# Patient Record
Sex: MALE | Race: WHITE | Employment: OTHER | URBAN - NONMETROPOLITAN AREA
[De-identification: names, ages, dates, MRNs, and addresses within clinical notes are randomized per-mention and may not be internally consistent; named-entity substitution may affect disease eponyms.]

---

## 2017-01-03 ENCOUNTER — TELEPHONE (OUTPATIENT)
Dept: CARDIOLOGY | Age: 67
End: 2017-01-03

## 2017-01-23 ENCOUNTER — OFFICE VISIT (OUTPATIENT)
Dept: CARDIOLOGY | Age: 67
End: 2017-01-23

## 2017-01-23 VITALS
SYSTOLIC BLOOD PRESSURE: 135 MMHG | BODY MASS INDEX: 46.6 KG/M2 | WEIGHT: 263 LBS | HEIGHT: 63 IN | HEART RATE: 84 BPM | DIASTOLIC BLOOD PRESSURE: 75 MMHG

## 2017-01-23 DIAGNOSIS — R60.0 EDEMA OF BOTH LEGS: ICD-10-CM

## 2017-01-23 PROCEDURE — 99213 OFFICE O/P EST LOW 20 MIN: CPT | Performed by: INTERNAL MEDICINE

## 2017-01-23 RX ORDER — FUROSEMIDE 40 MG/1
40 TABLET ORAL 2 TIMES DAILY
COMMUNITY

## 2017-01-24 DIAGNOSIS — R00.2 PALPITATION: ICD-10-CM

## 2017-01-24 DIAGNOSIS — R60.0 BILATERAL LEG EDEMA: ICD-10-CM

## 2017-01-24 DIAGNOSIS — I47.1 SVT (SUPRAVENTRICULAR TACHYCARDIA) (HCC): ICD-10-CM

## 2017-01-24 DIAGNOSIS — R00.0 TACHYCARDIA: ICD-10-CM

## 2017-07-24 ENCOUNTER — OFFICE VISIT (OUTPATIENT)
Dept: CARDIOLOGY CLINIC | Age: 67
End: 2017-07-24
Payer: MEDICARE

## 2017-07-24 VITALS
HEIGHT: 63 IN | WEIGHT: 268 LBS | DIASTOLIC BLOOD PRESSURE: 80 MMHG | BODY MASS INDEX: 47.48 KG/M2 | SYSTOLIC BLOOD PRESSURE: 160 MMHG | HEART RATE: 80 BPM

## 2017-07-24 DIAGNOSIS — R60.0 BILATERAL LEG EDEMA: Primary | ICD-10-CM

## 2017-07-24 PROCEDURE — 99213 OFFICE O/P EST LOW 20 MIN: CPT | Performed by: INTERNAL MEDICINE

## 2018-01-22 ENCOUNTER — OFFICE VISIT (OUTPATIENT)
Dept: CARDIOLOGY CLINIC | Age: 68
End: 2018-01-22
Payer: MEDICARE

## 2018-01-22 VITALS
WEIGHT: 260 LBS | HEIGHT: 62 IN | HEART RATE: 68 BPM | SYSTOLIC BLOOD PRESSURE: 136 MMHG | DIASTOLIC BLOOD PRESSURE: 74 MMHG | BODY MASS INDEX: 47.84 KG/M2

## 2018-01-22 DIAGNOSIS — R60.0 EDEMA OF BOTH LEGS: Primary | ICD-10-CM

## 2018-01-22 PROCEDURE — 99212 OFFICE O/P EST SF 10 MIN: CPT | Performed by: INTERNAL MEDICINE

## 2018-01-22 NOTE — PROGRESS NOTES
Today's Date: 1/22/2018  Patient Name: Rosario Reagan  Patient's age: 78 y.o., 1950          The patient is a 78 y.o.  male is in the office for f/u, doing well cardiac wise, no new cardiac complaints. Past Medical History:   has a past medical history of Edema of both legs. Past Surgical History:   has a past surgical history that includes Femur Surgery. Home Medications:    Prior to Admission medications    Medication Sig Start Date End Date Taking? Authorizing Provider   furosemide (LASIX) 40 MG tablet Take 40 mg by mouth 2 times daily    Historical Provider, MD   Multiple Vitamins-Minerals (MULTI COMPLETE PO) Take 1 tablet by mouth daily    Historical Provider, MD       Allergies:  Review of patient's allergies indicates no known allergies. Social History:   reports that he has never smoked. He has never used smokeless tobacco. He reports that he does not drink alcohol or use drugs. REVIEW OF SYSTEMS:  CONSTITUTIONAL:NEGATIVE  HEENT:NEG  Cardiovascular: No chest pain, No dyspnea on exertion, No palpitations. Lower extremity edema: Yes  RESPIRATORY: DU  GASTROINTESTINAL:  negative  GENITOURINARY:  negative  INTEGUMENT:  negative  MUSCULOSKELETAL:  positive for  pain  NEUROLOGICAL:  negative    PHYSICAL EXAM:      /74   Pulse 68   Ht 5' 2\" (1.575 m)   Wt 260 lb (117.9 kg)   BMI 47.55 kg/m²    HEENT: PERRL, no cervical lymphadenopathy. No masses palpable. Cardiovascular:  · The apical impulse is not displaced  · Heart  Sounds:RRR, S4  · Jugular venous pulsation Normal  · The carotid upstroke is NL  · Peripheral pulses are symmetrical and full  Respiratory: Good respiratory effort.  On auscultation: clear to auscultation bilaterally  Abdomen:  · No masses or tenderness  · Bowel sounds present  Extremities:  ·  No Cyanosis or Clubbing  ·  Lower extremity edema: Yes  Skin: Warm and dry    Cardiac data:    EKG: SR, RBBB    Labs:     CBC: No results for

## 2018-09-17 ENCOUNTER — OFFICE VISIT (OUTPATIENT)
Dept: CARDIOLOGY CLINIC | Age: 68
End: 2018-09-17
Payer: MEDICARE

## 2018-09-17 VITALS
BODY MASS INDEX: 46.38 KG/M2 | WEIGHT: 252 LBS | SYSTOLIC BLOOD PRESSURE: 116 MMHG | DIASTOLIC BLOOD PRESSURE: 70 MMHG | HEIGHT: 62 IN | HEART RATE: 88 BPM

## 2018-09-17 DIAGNOSIS — R60.0 EDEMA OF BOTH LEGS: Primary | ICD-10-CM

## 2018-09-17 PROCEDURE — 99213 OFFICE O/P EST LOW 20 MIN: CPT | Performed by: INTERNAL MEDICINE

## 2018-09-17 RX ORDER — LISINOPRIL 10 MG/1
10 TABLET ORAL DAILY
COMMUNITY

## 2018-09-17 RX ORDER — DOXYCYCLINE HYCLATE 50 MG/1
100 CAPSULE ORAL 2 TIMES DAILY
COMMUNITY
Start: 2018-07-12 | End: 2019-03-04 | Stop reason: ALTCHOICE

## 2018-09-17 RX ORDER — ATORVASTATIN CALCIUM 20 MG/1
20 TABLET, FILM COATED ORAL DAILY
COMMUNITY

## 2018-09-17 NOTE — PROGRESS NOTES
Today's Date: 9/17/2018  Patient Name: Dannielle Morrison  Patient's age: 80 y.o., 1950          The patient is a 80 y.o.  male is in the office for f/u, Patient has been doing well cardiac wise, no new cardiac complaints. He denies angina, PND/Orthopnea.'      Past Medical History:   has a past medical history of Edema of both legs. Past Surgical History:   has a past surgical history that includes Femur Surgery. Home Medications:    Prior to Admission medications    Medication Sig Start Date End Date Taking? Authorizing Provider   doxycycline (VIBRAMYCIN) 50 MG capsule Take 100 mg by mouth 2 times daily 7/12/18  Yes Historical Provider, MD   furosemide (LASIX) 40 MG tablet Take 40 mg by mouth 2 times daily   Yes Historical Provider, MD   Multiple Vitamins-Minerals (MULTI COMPLETE PO) Take 1 tablet by mouth daily   Yes Historical Provider, MD   atorvastatin (LIPITOR) 20 MG tablet Take 20 mg by mouth daily    Historical Provider, MD   lisinopril (PRINIVIL;ZESTRIL) 10 MG tablet Take 10 mg by mouth daily    Historical Provider, MD       Allergies:  Patient has no known allergies. Social History:   reports that he has never smoked. He has never used smokeless tobacco. He reports that he does not drink alcohol or use drugs. REVIEW OF SYSTEMS:  CONSTITUTIONAL:NEGATIVE  HEENT:NEG  Cardiovascular: No chest pain, Yes dyspnea on exertion, No palpitations. Lower extremity edema: Yes  RESPIRATORY: DU  GASTROINTESTINAL:  negative  GENITOURINARY:  negative  INTEGUMENT:  negative  MUSCULOSKELETAL:  positive for  pain  NEUROLOGICAL:  negative    PHYSICAL EXAM:      /70   Pulse 88   Ht 5' 2\" (1.575 m)   Wt 252 lb (114.3 kg)   BMI 46.09 kg/m²    HEENT: PERRL, no cervical lymphadenopathy. No masses palpable.   Cardiovascular:  · The apical impulse is not displaced  · Heart  Sounds:RRR, S4  · Jugular venous pulsation Normal  · The carotid upstroke is NL  · Peripheral pulses are symmetrical and full  Respiratory: Good respiratory effort. On auscultation: clear to auscultation bilaterally  Abdomen:  · No masses or tenderness  · Bowel sounds present  Extremities:  ·  No Cyanosis or Clubbing  ·  Lower extremity edema: Yes  Skin: Warm and dry    Cardiac data:    EKG: SR, RBBB    Labs:     CBC: No results for input(s): WBC, HGB, HCT, PLT in the last 72 hours. BMP: No results for input(s): NA, K, CO2, BUN, CREATININE, LABGLOM, GLUCOSE in the last 72 hours. PT/INR: No results for input(s): PROTIME, INR in the last 72 hours. FASTING LIPID PANEL:No results found for: HDL, LDLDIRECT, LDLCALC, TRIG  LIVER PROFILE:No results for input(s): AST, ALT, LABALBU in the last 72 hours.     IMPRESSION:    HFpEF  HTN  HLP  LYMPHEDEMA  MORBID OBESITY  PVCs and APCs, ASX  RBBB: ASX  Patient Active Problem List   Diagnosis    SVT (supraventricular tachycardia) (HCC)    Tachycardia    Palpitation    Bilateral leg edema    Edema of both legs       RECOMMENDATIONS:  REGULAR EXERCISE  CALORIE RESTRICTION  WEIGHT LOSS  CONTINUE CURRENT TREATMENT    RTC 6  MONTHS          Yamileth Oleary [id number] Cardiology Consult           [phone]

## 2019-03-04 ENCOUNTER — OFFICE VISIT (OUTPATIENT)
Dept: CARDIOLOGY CLINIC | Age: 69
End: 2019-03-04
Payer: MEDICARE

## 2019-03-04 VITALS
SYSTOLIC BLOOD PRESSURE: 128 MMHG | WEIGHT: 253 LBS | HEIGHT: 62 IN | HEART RATE: 82 BPM | BODY MASS INDEX: 46.56 KG/M2 | DIASTOLIC BLOOD PRESSURE: 76 MMHG

## 2019-03-04 DIAGNOSIS — I47.1 SVT (SUPRAVENTRICULAR TACHYCARDIA) (HCC): ICD-10-CM

## 2019-03-04 DIAGNOSIS — R60.0 BILATERAL LEG EDEMA: ICD-10-CM

## 2019-03-04 DIAGNOSIS — E78.5 DYSLIPIDEMIA: ICD-10-CM

## 2019-03-04 DIAGNOSIS — I10 ESSENTIAL HYPERTENSION: Primary | ICD-10-CM

## 2019-03-04 PROCEDURE — 4040F PNEUMOC VAC/ADMIN/RCVD: CPT | Performed by: INTERNAL MEDICINE

## 2019-03-04 PROCEDURE — 99213 OFFICE O/P EST LOW 20 MIN: CPT | Performed by: INTERNAL MEDICINE

## 2019-03-04 PROCEDURE — 3017F COLORECTAL CA SCREEN DOC REV: CPT | Performed by: INTERNAL MEDICINE

## 2019-03-04 PROCEDURE — G8417 CALC BMI ABV UP PARAM F/U: HCPCS | Performed by: INTERNAL MEDICINE

## 2019-03-04 PROCEDURE — 1101F PT FALLS ASSESS-DOCD LE1/YR: CPT | Performed by: INTERNAL MEDICINE

## 2019-03-04 PROCEDURE — 1036F TOBACCO NON-USER: CPT | Performed by: INTERNAL MEDICINE

## 2019-03-04 PROCEDURE — 1123F ACP DISCUSS/DSCN MKR DOCD: CPT | Performed by: INTERNAL MEDICINE

## 2019-03-04 PROCEDURE — G8427 DOCREV CUR MEDS BY ELIG CLIN: HCPCS | Performed by: INTERNAL MEDICINE

## 2019-03-04 PROCEDURE — G8484 FLU IMMUNIZE NO ADMIN: HCPCS | Performed by: INTERNAL MEDICINE

## 2022-01-28 ENCOUNTER — HOSPITAL ENCOUNTER (OUTPATIENT)
Age: 72
Setting detail: SPECIMEN
Discharge: HOME | End: 2022-01-28
Payer: MEDICARE

## 2022-01-28 LAB — POTASSIUM SERPL-SCNC: 4.2 MMOL/L (ref 3.7–5.3)

## 2022-01-28 PROCEDURE — 84132 ASSAY OF SERUM POTASSIUM: CPT
